# Patient Record
Sex: FEMALE | Race: WHITE
[De-identification: names, ages, dates, MRNs, and addresses within clinical notes are randomized per-mention and may not be internally consistent; named-entity substitution may affect disease eponyms.]

---

## 2019-09-15 ENCOUNTER — HOSPITAL ENCOUNTER (INPATIENT)
Dept: HOSPITAL 92 - ERS | Age: 69
LOS: 4 days | Discharge: HOME | DRG: 175 | End: 2019-09-19
Attending: INTERNAL MEDICINE | Admitting: INTERNAL MEDICINE
Payer: MEDICARE

## 2019-09-15 VITALS — BODY MASS INDEX: 31.6 KG/M2

## 2019-09-15 DIAGNOSIS — Z86.718: ICD-10-CM

## 2019-09-15 DIAGNOSIS — I10: ICD-10-CM

## 2019-09-15 DIAGNOSIS — M19.90: ICD-10-CM

## 2019-09-15 DIAGNOSIS — I26.09: Primary | ICD-10-CM

## 2019-09-15 DIAGNOSIS — Z79.82: ICD-10-CM

## 2019-09-15 DIAGNOSIS — I21.A1: ICD-10-CM

## 2019-09-15 DIAGNOSIS — Z79.899: ICD-10-CM

## 2019-09-15 DIAGNOSIS — E03.9: ICD-10-CM

## 2019-09-15 DIAGNOSIS — G47.30: ICD-10-CM

## 2019-09-15 DIAGNOSIS — Z98.1: ICD-10-CM

## 2019-09-15 DIAGNOSIS — E87.1: ICD-10-CM

## 2019-09-15 DIAGNOSIS — J96.21: ICD-10-CM

## 2019-09-15 PROCEDURE — 71275 CT ANGIOGRAPHY CHEST: CPT

## 2019-09-15 PROCEDURE — 93005 ELECTROCARDIOGRAM TRACING: CPT

## 2019-09-15 PROCEDURE — 85018 HEMOGLOBIN: CPT

## 2019-09-15 PROCEDURE — 93970 EXTREMITY STUDY: CPT

## 2019-09-15 PROCEDURE — 85049 AUTOMATED PLATELET COUNT: CPT

## 2019-09-15 PROCEDURE — 80053 COMPREHEN METABOLIC PANEL: CPT

## 2019-09-15 PROCEDURE — 93306 TTE W/DOPPLER COMPLETE: CPT

## 2019-09-15 PROCEDURE — 83735 ASSAY OF MAGNESIUM: CPT

## 2019-09-15 PROCEDURE — 36415 COLL VENOUS BLD VENIPUNCTURE: CPT

## 2019-09-15 PROCEDURE — 96374 THER/PROPH/DIAG INJ IV PUSH: CPT

## 2019-09-15 PROCEDURE — 82565 ASSAY OF CREATININE: CPT

## 2019-09-15 PROCEDURE — 84443 ASSAY THYROID STIM HORMONE: CPT

## 2019-09-15 PROCEDURE — 85025 COMPLETE CBC W/AUTO DIFF WBC: CPT

## 2019-09-15 PROCEDURE — 83880 ASSAY OF NATRIURETIC PEPTIDE: CPT

## 2019-09-15 PROCEDURE — 85379 FIBRIN DEGRADATION QUANT: CPT

## 2019-09-15 PROCEDURE — 80048 BASIC METABOLIC PNL TOTAL CA: CPT

## 2019-09-15 PROCEDURE — 85014 HEMATOCRIT: CPT

## 2019-09-15 PROCEDURE — 71045 X-RAY EXAM CHEST 1 VIEW: CPT

## 2019-09-15 PROCEDURE — 82553 CREATINE MB FRACTION: CPT

## 2019-09-15 PROCEDURE — 84484 ASSAY OF TROPONIN QUANT: CPT

## 2019-09-16 LAB
ALBUMIN SERPL BCG-MCNC: 4.2 G/DL (ref 3.4–4.8)
ALP SERPL-CCNC: 80 U/L (ref 40–150)
ALT SERPL W P-5'-P-CCNC: 53 U/L (ref 8–55)
ANION GAP SERPL CALC-SCNC: 16 MMOL/L (ref 10–20)
AST SERPL-CCNC: 31 U/L (ref 5–34)
BASOPHILS # BLD AUTO: 0.1 THOU/UL (ref 0–0.2)
BASOPHILS NFR BLD AUTO: 0.7 % (ref 0–1)
BILIRUB SERPL-MCNC: 0.7 MG/DL (ref 0.2–1.2)
BUN SERPL-MCNC: 17 MG/DL (ref 9.8–20.1)
CALCIUM SERPL-MCNC: 10 MG/DL (ref 7.8–10.44)
CHLORIDE SERPL-SCNC: 102 MMOL/L (ref 98–107)
CK MB SERPL-MCNC: 3 NG/ML (ref 0–6.6)
CO2 SERPL-SCNC: 22 MMOL/L (ref 23–31)
CREAT CL PREDICTED SERPL C-G-VRATE: 0 ML/MIN (ref 70–130)
CREAT CL PREDICTED SERPL C-G-VRATE: 81 ML/MIN (ref 70–130)
EOSINOPHIL # BLD AUTO: 0.1 THOU/UL (ref 0–0.7)
EOSINOPHIL NFR BLD AUTO: 0.7 % (ref 0–10)
GLOBULIN SER CALC-MCNC: 3 G/DL (ref 2.4–3.5)
GLUCOSE SERPL-MCNC: 130 MG/DL (ref 80–115)
HGB BLD-MCNC: 14.3 G/DL (ref 12–16)
HGB BLD-MCNC: 15.6 G/DL (ref 12–16)
LYMPHOCYTES # BLD: 2.6 THOU/UL (ref 1.2–3.4)
LYMPHOCYTES NFR BLD AUTO: 17.4 % (ref 21–51)
MAGNESIUM SERPL-MCNC: 2.3 MG/DL (ref 1.6–2.6)
MCH RBC QN AUTO: 32.2 PG (ref 27–31)
MCV RBC AUTO: 95.9 FL (ref 78–98)
MONOCYTES # BLD AUTO: 1.2 THOU/UL (ref 0.11–0.59)
MONOCYTES NFR BLD AUTO: 8 % (ref 0–10)
NEUTROPHILS # BLD AUTO: 10.9 THOU/UL (ref 1.4–6.5)
NEUTROPHILS NFR BLD AUTO: 73.2 % (ref 42–75)
PLATELET # BLD AUTO: 292 THOU/UL (ref 130–400)
PLATELET # BLD AUTO: 392 THOU/UL (ref 130–400)
POTASSIUM SERPL-SCNC: 4.6 MMOL/L (ref 3.5–5.1)
RBC # BLD AUTO: 4.85 MILL/UL (ref 4.2–5.4)
SODIUM SERPL-SCNC: 135 MMOL/L (ref 136–145)
TROPONIN I SERPL DL<=0.01 NG/ML-MCNC: 0.04 NG/ML (ref ?–0.03)
TROPONIN I SERPL DL<=0.01 NG/ML-MCNC: 0.08 NG/ML (ref ?–0.03)
WBC # BLD AUTO: 14.9 THOU/UL (ref 4.8–10.8)

## 2019-09-16 NOTE — RAD
PORTABLE UPRIGHT FRONTAL CHEST RADIOGRAPH:

 

DATE: 9/16/2019.

 

COMPARISON: 

None.

 

HISTORY: 

Pain.

 

FINDINGS: 

Shadows are prominent suggesting prominence of the pulmonary arterial vasculature.  No pneumothorax o
r pleural fluid.  No focal consolidation or alveolar edema.

 

IMPRESSION: 

Findings suggesting nonspecific enlargement of the pulmonary arterial vasculature.  This could be bet
ter assessed via CT.  No focal consolidation or alveolar edema.

 

POS: OFF

## 2019-09-16 NOTE — ULT
DOPPLER VENOUS ULTRASOUND OF BOTH LOWER EXTREMITIES:

 

Date:  09/1/19 

 

INDICATION:

History of PE. 

 

TECHNIQUE:

Gray scale, color Doppler, and vascular duplex with spectral analysis was performed of the deep venou
s structures of the bilateral lower extremities.  The common femoral vein, superficial femoral vein, 
proximal greater saphenous vein, proximal greater profunda vein, popliteal, and posterior tibial vein
s were assessed.

 

FINDINGS:

Normal compression, flow, and augmentation was seen within the deep venous structures of both lower e
xtremities. 

 

IMPRESSION:

No evidence of deep venous thrombosis within both lower extremities. 

 

 

 

POS: LEANDER

## 2019-09-16 NOTE — HP
CHIEF COMPLAINT:  Shortness of breath.



HISTORY OF PRESENT ILLNESS:  Ms. Brar is a 69-year-old female with past medical

history of hypertension, hypothyroidism, and deep venous thrombosis, presents to the

emergency room with shortness of breath for the last two weeks, got worse over the

last few days.  Patient is not able to walk across the room without having to sit

down, condition got worse, patient presented to the emergency room today.  Patient

reports shoulder injury two weeks ago.  Believes it is a sprain.  Also, noticed that

her bilateral leg swelling in the last few days.  Workup in the emergency room

including CT angiogram, the patient was found to have a large pulmonary embolism

with right ventricular strain.  The ED physician discussed the case with

pulmonologist, who advised to give the patient tPA, and start Lovenox 4 hours later.

 Patient's BNP is more than 2000, troponin is elevated.  Patient is being admitted

to the intensive care unit for further management. 



PAST MEDICAL HISTORY:  

1. Hypertension.

2. Hypothyroidism.

3. Deep venous thrombosis.



PAST SURGICAL HISTORY:  

1. Spinal fusion.

2. Right ankle surgery.



SOCIAL HISTORY:  Reviewed.  It is elsewhere in the chart.



HOME MEDICATIONS:  Please see home medication reconciliation form for updated

medications. 



FAMILY HISTORY:  Reviewed and noncontributory.



REVIEW OF SYSTEMS:  Review of 14 systems negative, except what is mentioned in

present illness. 



PHYSICAL EXAMINATION:

GENERAL:  Patient is awake, alert, in moderate respiratory distress. 

VITAL SIGNS:  Blood pressure 129/79, pulse is 70, respiratory rate is 18, pulse

oximetry 97% on room air, and temperature is 98.2. 

HEAD AND NECK:  Normocephalic, atraumatic.  Neck is supple.  No JVD. 

CHEST:  Fair bilateral air entry, no added sounds. 

HEART:  S1, S2.  Regular. 

ABDOMEN:  Soft, nontender.  Bowel sounds present. 

NEUROLOGIC:  Awake, alert, and oriented x3. 

PSYCH:  Normal mood. 

EXTREMITIES:  Bilateral leg swelling.



LABORATORY DATA:  Troponin 0.04.  BNP 2709.  D-dimer elevated at 5.2.



IMAGING:  CT angiogram of the chest as mentioned above in the history of present

illness. 



ASSESSMENT AND PLAN:  

1. Acute pulmonary embolism with right ventricular strain, elevated D-dimer and

elevated troponin and elevated BNP. 

2. Hypertension.

3. Hypothyroidism.

4. History of deep venous thrombosis.



PLAN:  

1. Admit to the intensive care unit.

2. Tpa was given in the ED.

3. To start anticoagulation 4 hours later as per Pulmonary.

4. Pulmonary/intensivist's consult for evaluation and further management.

5. Get venous Doppler to rule out DVT.

6. 2D echo.

7. Reconcile home medications.

8. Expected length of stay is three midnights or more.







Job ID:  [id number]

## 2019-09-16 NOTE — CT
PRELIMINARY REPORT/[facility]/EMERGENCY AFTER HOURS PROCEDURE:



Addendum created by Odin Campbell MD on 9/16/2019 1:35 AM Central Time ([place] & [place])

These results were given to nurse Dillan Carballo on 9/16/2019 1:34 AM CDT who stated he would notify the 
patient's physician. Initial Report created on 9/16/2019 1:23 AM Central Time ([place] & [place])



EXAM:

CT Angiography Chest With Contrast



EXAM DATE/TIME:

9/16/2019 12:59 AM



CLINICAL HISTORY:

69 years old, female; Shortness of breath; Patient HX: F69 presents to ED for SOB. PT reports she has
 been SOB for a couple of weeks, has only been getting worse w/n past few days. Cannot walk across

room without having to sit down, normally healthy and very active. Seen 2 doctors about SX. PT diagno
sed with bronchitis and began taking abx on Thursday. Had associated cough w some clear sputum. PT

reports injuring shoulder 2 weeks ago, no FX in xrays, believes its a sprain, has been less active an
d then SOB began. Also been taking muscle relaxers. PT denies any chest pain. Has had low grade

fever x1 and some night sweats



TECHNIQUE:

Imaging protocol: Computed tomographic angiography of the chest with intravenous contrast.

3D rendering: MIP reconstructed images were created and reviewed.



COMPARISON:

No relevant prior studies available.



FINDINGS:

Pulmonary arteries: Extensive, acute-appearing pulmonary emboli within the distal main pulmonary kerline
brittany extending into the lobar and segmental branches of the upper and lower lobes bilaterally.

Aorta: Unremarkable. No aortic aneurysm. No aortic dissection.

Lungs: Small groundglass opacities within the anterior right upper lobe (series 2, image 38) in

posterior right upper lobe (series 2, image 57, possibly pneumonitis or pulmonary

infarction/hemorrhage.

Pleural space: Unremarkable. No pneumothorax. No pleural effusion.

Heart: Marked enlargement of the right atrial and right ventricular chambers, with RV/LV ratio of

approximately 1.4, compatible with acute right heart strain.

Lymph nodes: Unremarkable. No enlarged lymph nodes.

Bones/joints: Unremarkable. No acute fracture.

Soft tissues: Unremarkable.



IMPRESSION:

1. Extensive bilateral upper and lower lobe pulmonary emboli, with associated severe acute right

heart strain.

2. Small groundglass opacities within the anterior right upper lobe (series 2, image 38) in posterior


right upper lobe (series 2, image 57, possibly pneumonitis or pulmonary infarction/hemorrhage.



Thank you for allowing us to participate in the care of your patient.

Dictated and Authenticated by: Odin Campbell MD

09/16/2019 1:23 AM Central Time ([place] & [place])







FINAL REPORT:



CT ARTERIOGRAM CHEST WITH IV CONTRAST AND 3D IMAGING:



DATE: 9/16/2019.

TIME: Performed on an emergency basis at 0100 hours.



HISTORY: 

Chest pain. Dyspnea.



FINDINGS: 

Agree with the preliminary report by Dr. Campbell from [facility]. Extensive bilateral pulmonary
 emboli. Evidence of right heart strain.



Transcribed Date/Time: 9/16/2019 8:06 AM



Reported By: MARTHA Hernandez 

Electronically Signed:  9/16/2019 9:08 AM

## 2019-09-16 NOTE — PDOC.EVN
Event Note





- Event Note


Event Note: 





Seen and examined. Breathing more easily on low flow nasal canula. Denies chest 

pain or discomfort this AM. Overall states she is feeling much better. States 

she had a DVT LE after a 6 hour surgery, though that was more than 20 years 

ago. Family at bedside, all questions answered in detail.

## 2019-09-16 NOTE — CON
DATE OF CONSULTATION:  09/16/2019



HISTORY OF PRESENT ILLNESS:  Angie Brar is a 69-year-old female from

[place], [place], 95 kg, presented to the ER with shortness of breath.  In ER,

saturations are 96% on room air, blood pressure 125/83, respiratory rate 20, and

temperature 98. 



CT angio showed bilateral pulmonary emboli.  I am told she received 50 mg of tPA.

She is in the ICU. 



History as per the daughter who works at [employer].   She developed some

shoulder pain, went to the Urgent Care, was given some medication.  After she

developed bronchitis, went to see a primary care doctor, was given some antibiotics.

 Continued to have some shoulder pain and shortness of breath.  Daughter brought

over here because she is not getting any better. 



Nonsmoker.  No prior history of TB, pneumonia, or bronchial asthma.



PAST MEDICAL HISTORY:  Pertinent mainly for arthritis, hypertension, and

hypothyroidism. 



PREVIOUS SURGERIES:  Ankle surgery and spinal fusion surgery.



HOME MEDICATIONS:  

1. Magnesium.

2. Benadryl.

3. Melatonin.

4. Sectral 200.

5. B complex.

6. Garlic.

7. Vitamin E.

8. Aspirin 81.

9. Pravachol 20.

10. Synthroid 25.

11. Hydrochlorothiazide 12.5.

12. Irbesartan 150.



ALLERGIES:  NONE.



SOCIAL HISTORY:  Unremarkable.



FAMILY HISTORY:  Unremarkable.



PHYSICAL EXAMINATION:

GENERAL:  In the ICU.  She is in no distress. 

VITAL SIGNS:  Blood pressure 156/80, pulse 80, respiratory rate 16. 

CHEST: No wheezing or crackles. 

CARDIAC:  Normal S1, S2.  No gallops. 

ABDOMEN:  No masses.



LABORATORY DATA:  H and H are stable.  Platelet count is normal.  Her BNP is

elevated at 2709, surprising for slightly elevated. Glucose 130. 



IMPRESSION:  

1. Status post tPA, apparently 50 mg were given.

2. Bilateral pulmonary emboli.  No evidence of deep venous thrombosis.

3. Hypertension.

4. Hypothyroidism.

5. __________ previous deep venous thrombosis in the leg following this spinal

fusion 20 years ago. 



PLAN:  She was started on Lovenox  __________ eventually when she is stable, switch

over to Eliquis.  We will follow her in the ICU. 



Consultation note, 70 minutes, 50% direct patient care.







Job ID:  [id number]

## 2019-09-17 RX ADMIN — Medication SCH TAB: at 10:31

## 2019-09-17 RX ADMIN — ASPIRIN SCH MG: 81 TABLET ORAL at 10:30

## 2019-09-17 NOTE — PDOC.HOSPP
- Subjective


Encounter Date: 09/17/19


Encounter Time: 17:10


Subjective: 





Dry cough, feels legs are a bit swollen; at home uses HCTZ 12.5mg daily with 

potassium M/W/F.  No nausea, appetite returning, up walking around room. 

Daughter at bedside.





- Objective


Vital Signs & Weight: 


 Vital Signs (12 hours)











  Temp Pulse Pulse BP BP Pulse Ox Pulse Ox


 


 09/17/19 16:00  98.5 F      


 


 09/17/19 12:00  98.4 F      


 


 09/17/19 10:00   67  71  127/59 L  122/68   94 L


 


 09/17/19 08:00       92 L 


 


 09/17/19 07:00  98.1 F      














  Pulse Ox


 


 09/17/19 16:00 


 


 09/17/19 12:00 


 


 09/17/19 10:00  93 L


 


 09/17/19 08:00 


 


 09/17/19 07:00 








 Weight











Admit Weight                   201 lb


 


Weight                         201 lb 15.095 oz











 Most Recent Monitor Data











Heart Rate from ECG            66


 


NIBP                           134/65


 


NIBP BP-Mean                   88


 


Respiration from ECG           13


 


SpO2                           92














I&O: 


 











 09/16/19 09/17/19 09/18/19





 06:59 06:59 06:59


 


Intake Total 51.9 1923.8 720


 


Output Total 650 1700 275


 


Balance -598.1 223.8 445











Result Diagrams: 


 09/16/19 02:58





 09/16/19 02:58





Hospitalist ROS





- Medication


Medications: 


Active Medications











Generic Name Dose Route Start Last Admin





  Trade Name Freq  PRN Reason Stop Dose Admin


 


Acebutolol HCl  200 mg  09/16/19 21:00  09/16/19 22:11





  Sectral  PO   Not Given





  QPM SHERMAN   





     





     





     





     


 


Aspirin  81 mg  09/17/19 09:00  09/17/19 10:30





  Ecotrin  PO   81 mg





  DAILY SHERMAN   Administration





     





     





     





     


 


Diphenhydramine HCl  50 mg  09/16/19 21:00  09/16/19 21:57





  Benadryl  PO   50 mg





  HS SHERMAN   Administration





     





     





     





     


 


Levothyroxine Sodium  25 mcg  09/17/19 06:00  09/17/19 07:05





  Synthroid  PO   25 mcg





  0600 SHERMAN   Administration





     





     





     





     


 


Losartan Potassium  50 mg  09/17/19 09:00  09/17/19 10:33





  Cozaar  PO   50 mg





  DAILY SHERMAN   Administration





     





     





     





     


 


Magnesium Oxide  800 mg  09/17/19 09:00  09/17/19 10:31





  Magnesium Oxide  PO   800 mg





  DAILY SHERMAN   Administration





     





     





     





     


 


Melatonin  9 mg  09/16/19 21:00  09/16/19 21:58





  Melatonin  PO   9 mg





  HS SHERMAN   Administration





     





     





     





     


 


Multivitamins/Zinc  1 tab  09/17/19 09:00  09/17/19 10:31





  Stress 600 With Zinc  PO   1 tab





  DAILY SHERMAN   Administration





     





     





     





     


 


Pravastatin Sodium  20 mg  09/16/19 21:00  09/16/19 21:59





  Pravachol  PO   20 mg





  QPM SHERMAN   Administration





     





     





     





     














- Exam


General Appearance: NAD


Eye: PERRL


ENT: moist mucosa


Neck: supple


Heart: RRR


Respiratory: no wheezes


Respiratory - other findings: right mid zone crackles


Gastrointestinal: soft, non-tender, non-distended


Extremities - other findings: trace pedal edema


Skin: no rashes


Neurological: no new deficit


Musculoskeletal: no muscle wasting


Psychiatric: A&O x 3





Hosp A/P


(1) Pulmonary embolism


Code(s): I26.99 - OTHER PULMONARY EMBOLISM WITHOUT ACUTE COR PULMONALE   Status

: Acute   





(2) Hypertension


Code(s): I10 - ESSENTIAL (PRIMARY) HYPERTENSION   Status: Acute   





(3) Hypothyroid


Code(s): E03.9 - HYPOTHYROIDISM, UNSPECIFIED   Status: Acute   





- Plan





Pulm - planned transition to [facility], appreciate Dr. Hawthorne's care; LE 

dopplers negative for DVT


Cards - restart home HCTZ, oral K every m/w/f


Endo - continue home thyroid medication


Transfer to tele

## 2019-09-17 NOTE — PRG
DATE OF SERVICE:  09/17/2019



SERVICE:  Pulmonary Medicine.



INTERVAL HISTORY:  The patient is doing really well from respiratory standpoint.

She has been weaned down to room air.  She has noted complaints of shortness of

breath or dyspnea with exertion.  She denies any fevers or chills.  She had an

uneventful evening.  Ever since she got the TPN, her breathing, and shoulder

discomfort had eased up. 



PHYSICAL EXAMINATION:

VITAL SIGNS:  Afebrile, pulse 66, blood pressure 134/65, respirations 13, saturation

92%, currently on room air. 

GENERAL:  The patient is awake and alert, in no apparent distress. 

LUNGS:  Decent air entry.  There is no prolonged expiratory phase or wheezing

appreciated. 

HEART:  Normal rate and regular. 

ABDOMEN:  Soft, nontender, nondistended.  Bowel sounds are positive. 

MUSCULOSKELETAL:  No cyanosis or clubbing.  There is no pitting in the bilateral

lower extremities. 

NEUROLOGIC:  Grossly nonfocal.



LABORATORY DATA:  Hemoglobin 14.3 and stable, platelets 292,000.  D-dimer 5.20.

Troponin is gently uptrending as of this morning.  BNP 2700, TSH 2.5.  Basic

metabolic profile and liver function studies are otherwise unremarkable. 



IMAGING STUDIES:  Echocardiogram shows a normal ejection fraction.  The right atrium

is enlarged.  The right ventricle is moderately enlarged.  RVSP is mildly elevated.

IVC is dilated. 



CTA of the chest demonstrates large volume pulmonary emboli are present throughout

bilateral lung fields.  RV and RA are dilated.  There is significant reflux of

contrast into the inferior vena cava.  The flattening of the septum suggestive of

right volume overload. 



Ultrasound of the bilateral lower extremities demonstrates no evidence of DVT.



ASSESSMENT:  

1. Acute hypoxic respiratory failure, resolved.

2. Acute pulmonary embolism, submassive, status post tPA.

3. Right ventricular heart strain.

4. Non-ST-elevation myocardial infarction secondary to the demand of the pulmonary

embolism. 

5. Hyponatremia, mild.



DISCUSSION AND PLAN:  I am going to be repeating the troponin and the BNP tomorrow

morning.  If they are not downtrending, we will need to consider transitioning the

patient to [place] for more advanced procedure.  We will also check the sodium.  IV

fluids are going to be interrupted.  I am going to cautiously give her a small dose

of Lasix.  Pulmonary/Critical Care will continue to follow along while the patient

remains in-house, but she is stable for transition to the telemetry unit.  We are

going to initiate some mobilization efforts. 







Job ID:  [id number]

## 2019-09-18 LAB
ANION GAP SERPL CALC-SCNC: 11 MMOL/L (ref 10–20)
BUN SERPL-MCNC: 8 MG/DL (ref 9.8–20.1)
CALCIUM SERPL-MCNC: 8.8 MG/DL (ref 7.8–10.44)
CHLORIDE SERPL-SCNC: 102 MMOL/L (ref 98–107)
CO2 SERPL-SCNC: 27 MMOL/L (ref 23–31)
CREAT CL PREDICTED SERPL C-G-VRATE: 92 ML/MIN (ref 70–130)
GLUCOSE SERPL-MCNC: 95 MG/DL (ref 80–115)
HGB BLD-MCNC: 12.3 G/DL (ref 12–16)
POTASSIUM SERPL-SCNC: 3.6 MMOL/L (ref 3.5–5.1)
SODIUM SERPL-SCNC: 136 MMOL/L (ref 136–145)
TROPONIN I SERPL DL<=0.01 NG/ML-MCNC: 0.03 NG/ML (ref ?–0.03)

## 2019-09-18 RX ADMIN — ASPIRIN SCH MG: 81 TABLET ORAL at 08:02

## 2019-09-18 RX ADMIN — Medication SCH TAB: at 08:01

## 2019-09-18 NOTE — PRG
DATE OF SERVICE:  09/18/2019



SERVICE:  Pulmonary Medicine.



INTERVAL HISTORY:  The patient is doing fine from respiratory standpoint.  
Breathing

comfortably.  She is able to walk and ambulate without need for any oxygen.  She

does not have any significant desaturation there.  Her shortness of breath when 
she

is exerting herself is almost to baseline.  There were no overnight events.  
She had

continuous desaturation down to as low as 86%.  The alarms are going off 
frequently.

 As such, she was placed on a little bit of oxygen, which seemed to resolve the

nighttime hypoxemia.  It is not clear as to whether or not this is a chronic 
thing

or an acute thing.  I favor this being a chronic related condition, given that 
her

daytime symptoms have resolved.  It is not clear whether or not this was a saw-
tooth

desaturation associated with sleep apnea, whether or not she just simply 
dropped her

saturations because of not taking deep breaths.  Either way, this can be sorted 
out

in the outpatient setting. 



PHYSICAL EXAMINATION:

VITAL SIGNS:  Afebrile, pulse 70, blood pressure 107/61, respirations 14, and

saturation 95% on room air. 

GENERAL:  The patient is awake and alert, in no apparent distress. 

LUNGS:  Excellent air entry with no prolonged expiratory phase or wheezing 
present. 

HEART:  Normal rate, regular. 

ABDOMEN:  Soft, nontender, nondistended.  Bowel sounds are positive. 

MUSCULOSKELETAL:  No cyanosis or clubbing.  There is 1+ pitting in the bilateral

lower extremities. 

NEUROLOGIC:  Grossly nonfocal.



LABORATORY DATA:  Hemoglobin 12.3.  Basic metabolic profile is completely

unremarkable.  Creatinine is downtrending.  Her troponin has cleared, and her 
BNP

has downtrended from 2700 to 370. 



ASSESSMENT:  

1. Acute hypoxic respiratory failure, resolved during the daytime and with 
exertion.

2. Chronic hypoxic respiratory failure, suspected at night.

3. Acute pulmonary embolism, submassive, status post tPA.

4. Right ventricular heart strain, resolving.

5. Non-ST-elevation myocardial infarction secondary to demand from the pulmonary

embolism, resolved. 

6. Hyponatremia, resolved.



DISCUSSION AND PLAN:  The patient is doing fantastic from a respiratory 
standpoint.

At this point, I think she is stable for transition out of the hospital.  I 
would

like for her to go home with what I presumed to be a chronic hypoxic respiratory

failure at night.  In about a month, I will repeat an overnight oximetry study.
  If

she has saw-tooth desaturation, she will be referred for a polysomnogram.  If 
on the

other hand, she has continuous desaturation, oxygen will be continued, 
indefinitely

at night.  She indicates that she had much improved quality of rest with the 
oxygen.

 As such, I do think that this could be chronic in nature. 







Job ID:  [id number]



MTDD

## 2019-09-18 NOTE — PDOC.HOSPP
- Subjective


Encounter Date: 09/18/19


Encounter Time: 10:30


Subjective: 





pt up in bed no complains





- Objective


Vital Signs & Weight: 


 Vital Signs (12 hours)











  Temp Pulse Resp BP Pulse Ox


 


 09/18/19 16:30  97.6 F  67  18  129/74  96


 


 09/18/19 16:00  98.4 F    


 


 09/18/19 12:00  98.4 F    


 


 09/18/19 07:35      94 L


 


 09/18/19 07:00  98.4 F    








 Weight











Admit Weight                   201 lb


 


Weight                         201 lb 15.095 oz











 Most Recent Monitor Data











Heart Rate from ECG            63


 


NIBP                           111/65


 


NIBP BP-Mean                   80


 


Respiration from ECG           21


 


SpO2                           96














I&O: 


 











 09/17/19 09/18/19 09/19/19





 06:59 06:59 06:59


 


Intake Total 1923.8 1212 960


 


Output Total 1700 4175 1950


 


Balance 223.8 -2963 -990











Result Diagrams: 


 09/18/19 05:00





 09/18/19 05:00





Hospitalist ROS





- Review of Systems


Respiratory: denies: cough, dry, shortness of breath, hemoptysis, SOB with 

excertion, pleuritic pain, sputum, wheezing, other


Cardiovascular: denies: chest pain, palpitations, orthopnea, paroxysmal noc. 

dyspnea, edema, light headedness, other


Gastrointestinal: denies: nausea, vomiting, abdominal pain, diarrhea, 

constipation, melena, hematochezia, other





- Medication


Medications: 


Active Medications











Generic Name Dose Route Start Last Admin





  Trade Name Freq  PRN Reason Stop Dose Admin


 


Acebutolol HCl  200 mg  09/16/19 21:00  09/17/19 21:46





  Sectral  PO   200 mg





  QPM SHERMAN   Administration





     





     





     





     


 


Apixaban  10 mg  09/17/19 21:00  09/18/19 08:01





  Eliquis  PO  09/24/19 21:01  10 mg





  BID SHERMAN   Administration





     





     





     





     


 


Aspirin  81 mg  09/17/19 09:00  09/18/19 08:02





  Ecotrin  PO   81 mg





  DAILY SHERMAN   Administration





     





     





     





     


 


Benzonatate  100 mg  09/16/19 18:24  09/18/19 10:23





  Tessalon  PO   100 mg





  Q4H PRN   Administration





  Cough   





     





     





     


 


Diphenhydramine HCl  50 mg  09/16/19 21:00  09/17/19 23:04





  Benadryl  PO   50 mg





  HS SHERMAN   Administration





     





     





     





     


 


Hydrochlorothiazide  12.5 mg  09/18/19 09:00  09/18/19 08:02





  Hydrochlorothiazide  PO   12.5 mg





  DAILY SHERMAN   Administration





     





     





     





     


 


Levothyroxine Sodium  25 mcg  09/17/19 06:00  09/18/19 06:09





  Synthroid  PO   25 mcg





  0600 SHERMAN   Administration





     





     





     





     


 


Losartan Potassium  50 mg  09/17/19 09:00  09/18/19 08:02





  Cozaar  PO   50 mg





  DAILY SHERMAN   Administration





     





     





     





     


 


Magnesium Oxide  800 mg  09/17/19 09:00  09/18/19 08:02





  Magnesium Oxide  PO   800 mg





  DAILY SHERMAN   Administration





     





     





     





     


 


Melatonin  9 mg  09/16/19 21:00  09/17/19 23:05





  Melatonin  PO   9 mg





  HS SHERMAN   Administration





     





     





     





     


 


Multivitamins/Zinc  1 tab  09/17/19 09:00  09/18/19 08:01





  Stress 600 With Zinc  PO   1 tab





  DAILY SHERMAN   Administration





     





     





     





     


 


Potassium Chloride  10 meq  09/18/19 09:00  09/18/19 08:02





  Klor-Con 10  PO   10 meq





  MWF SHERMAN   Administration





     





     





     





     


 


Pravastatin Sodium  20 mg  09/16/19 21:00  09/17/19 21:47





  Pravachol  PO   20 mg





  QPM SHERMAN   Administration





     





     





     





     














- Exam


Neck: negative: supple, symmetric, no JVD, no thyromegaly, no lymphadenopathy, 

no carotid bruit, JVD


Heart: negative: RRR, no murmur, no gallops, no rubs, normal peripheral pulses, 

irregular, diminshed peripheral pulses, murmur present, II/IV, III/IV


Respiratory: negative: CTAB, no wheezes, no rales, no ronchi, normal chest 

expansion, no tachypnea, normal percussion, rales, rhonchi, tachypneic, wheezes


Gastrointestinal: negative: soft, non-tender, non-distended, normal bowel sounds

, no palpable masses, no hepatomegaly, no splenomegaly, no bruit, no guarding, 

no rigidity, tender to palpation, distended, diminished bowl sounds, voluntary 

guarding





Hosp A/P


(1) Pulmonary embolism


Code(s): I26.99 - OTHER PULMONARY EMBOLISM WITHOUT ACUTE COR PULMONALE   Status

: Acute   





(2) Hypertension


Code(s): I10 - ESSENTIAL (PRIMARY) HYPERTENSION   Status: Acute   





(3) Hypothyroid


Code(s): E03.9 - HYPOTHYROIDISM, UNSPECIFIED   Status: Acute   





- Plan





will continue eliquis. pt feels well. Not sure if pt needs oxygen.

## 2019-09-19 VITALS — SYSTOLIC BLOOD PRESSURE: 155 MMHG | DIASTOLIC BLOOD PRESSURE: 70 MMHG

## 2019-09-19 VITALS — TEMPERATURE: 98 F

## 2019-09-19 RX ADMIN — Medication SCH TAB: at 10:04

## 2019-09-19 RX ADMIN — ASPIRIN SCH MG: 81 TABLET ORAL at 10:02

## 2019-09-19 NOTE — PQF
CLINICAL DOCUMENTATION IMPROVEMENT CLARIFICATION FORM:  ICD-10 Updated

PLEASE DO AN ADDENDUM TO THE PROGRESS NOTE WITH ANY DOCUMENTATION UPDATES OR 
ADDITIONS AND CARRY THROUGH TO DC SUMMARY.   THANK YOU.



DATE:   9/19/2019                                           ATTN:  Dr. Gonzales



Please exercise your independent, professional judgment in responding to the 
clarification form. 

Clinical indicators are provided on the bottom of this form for your review



Please check appropriate box(s):

Conflicting documentation was noted in the Medical Record, please clarify if 
patient is being treated/monitored for:

[ x ] Pulmonary Embolism with associated Acute Cor Pulmonale   

[  ] Pulmonary Embolism  without associated Acute Cor Pulmonale

[  ] Other diagnosis ___________

[  ] Unable to determine



In addition, please specify:

Present on Admission (POA):  [  x] Yes             [  ] No             [  ] 
Unable to determine



For continuity of documentation, please document condition throughout progress 
notes and discharge summary.  Thank You.



CLINICAL INDICATORS - SIGNS / SYMPTOMS/ LABS 



H&P 9/16: Acute pulmonary embolism with right ventricular strain, elevated  D-
dimer and 

                elevated troponin and elevated BNP.



9/17 ([place])  Acute pulmonary embolism, submassive, s/p tPA.

                       Right ventricular heart strain.



PN 9/17: Pulmonary embolis. Other Pulmonary Embolism without acute cor 
pulmonale. Acute.



RISKS:

H&P 9/16: HTN. Hx of Deep Venous Thrombosis. 



TREATMENT: 

ER Record 9/16: tPA IV 

Order 9/16: Resp: O2 to keep sats 92%

Order 9/16: ECHO

MAR 9/17: Eliquis 10 mg po BID

______________________________________________________



Thank you,

Angie



(This form is maintained as a part of the permanent medical record)

 2015 [employer].  All Rights Reserved

Angie Villafuerte RN, BSN    [email or web address]    Office: [phone]

                                                              

[facility]

## 2019-09-20 NOTE — DIS
DATE OF ADMISSION:  09/16/2019



DATE OF DISCHARGE:  09/19/2019



DISCHARGE DIAGNOSES:  

1. Bilateral massive pulmonary embolism, status post tPA.

2. Hypertension.

3. Hypothyroidism.

4. RV straining.



HOSPITAL COURSE:  The patient is a 69-year-old female who initially presented to the

hospital with shoulder pain, which progressively over the weeks got worse to the

point that she came into the hospital with some shortness of breath and chest

tightness.  She underwent a CTA which indicated extensive bilateral upper and lower

pulmonary emboli associated with severe acute right heart straining.  She was given

a half dose of tPA and she was then admitted into the ICU with closer monitoring.

Initially her BNP was significantly elevated; however, upon rechecking it, BNP

improved.  She did have a LYDIA, which indicated an EF of 50% to 55%.  She did have a

mild enlarged right atrium and also a dilated IVC and a right ventricular systolic

pressure which was mildly elevated.  She was seen by Pulmonary, who initially

started her, after the tPA, on Lovenox and thus was transitioned to Eliquis.  She

will take 7 days of Eliquis 10 mg twice a day and then transition over to 5 mg twice

a day.  The patient was found to have nocturnal hypoxia and per Pulmonology's note,

she was supposed to follow up.  She was supposed to get night O2, however, per

insurance qualification, she could not.  The patient will pay out of pocket for

this.  She will follow up with Pulmonary in about 2 weeks for re-evaluation. 



I have told the patient and the family that she will need screening for mammogram

and colonoscopy in the future.  Also, she will follow up with Pulmonary to repeat

echocardiogram. 



HOME MEDICATIONS:  

1. Eliquis 5 mg, initially 10 mg twice a day for 7 days, which is an additional 5

days and then 5 mg twice a day. 

2. Hydrochlorothiazide 12.5 daily.

3. Irbesartan 150 mg daily.

4. Pravastatin 20 mg daily.

5. Levothyroxine 25 mcg daily.

6. Aspirin 81 mg daily.

7. Melatonin 10 mg at bedtime.



PHYSICAL EXAMINATION:

VITAL SIGNS:  Temperature of 98.0, 68, 18, 95% on room air, 140/68. 

GENERAL:  She is awake, alert, and oriented x3.  Does not appear in distress. 

CV:  S1, S2 present.  No murmurs, rubs, or gallops. 



The patient has been ambulated multiple times around and she has had no signs of

shortness of breath, however, except when she sleeps at night.  She has probably an

underlying sleep apnea. 







Job ID:  [id number]

## 2019-09-21 NOTE — EKG
Test Reason : 

Blood Pressure : ***/*** mmHG

Vent. Rate : 073 BPM     Atrial Rate : 073 BPM

   P-R Int : 216 ms          QRS Dur : 088 ms

    QT Int : 466 ms       P-R-T Axes : 059 172 057 degrees

   QTc Int : 513 ms

 

Sinus rhythm with 1st degree A-V block

Low voltage QRS

Inferior infarct , age undetermined

Possible Anterolateral infarct , age undetermined

Prolonged QT

Abnormal ECG

 

Confirmed by JAYJAY VAZQUEZ ([id number]),  SHAN SOLANO ([id number]) on 9/21/2019 1:23:12 PM

 

Referred By:             Confirmed By:JAYJAY VAZQUEZ

## 2019-09-21 NOTE — EKG
Test Reason : SOB

Blood Pressure : ***/*** mmHG

Vent. Rate : 083 BPM     Atrial Rate : 083 BPM

   P-R Int : 198 ms          QRS Dur : 078 ms

    QT Int : 402 ms       P-R-T Axes : 052 166 078 degrees

   QTc Int : 472 ms

 

Normal sinus rhythm

Inferior infarct , age undetermined

Anterolateral infarct , age undetermined

Abnormal ECG

T wave inversion V3-V6

 

Confirmed by JAYJAY VAZQUEZ ([id number]),  SHAN SOLANO ([id number]) on 9/21/2019 1:21:53 PM

 

Referred By:             Confirmed By:JAYJAY VAZQUEZ

## 2019-09-21 NOTE — EKG
Test Reason : 

Blood Pressure : ***/*** mmHG

Vent. Rate : 076 BPM     Atrial Rate : 076 BPM

   P-R Int : 208 ms          QRS Dur : 080 ms

    QT Int : 468 ms       P-R-T Axes : 056 167 074 degrees

   QTc Int : 526 ms

 

Normal sinus rhythm

Inferior infarct , age undetermined

Anterolateral infarct , age undetermined

Prolonged QT

Abnormal ECG

#2

Evolving T wave inversions V2,V3

 

Confirmed by JAYJAY VAZQUEZ ([id number]),  SHAN SOLANO ([id number]) on 9/21/2019 1:22:51 PM

 

Referred By:             Confirmed By:JAYJAY VAZQUEZ

## 2023-08-30 NOTE — PRG
DATE OF SERVICE:  09/19/2019



SERVICE:  Pulmonary Medicine.



INTERVAL HISTORY:  The patient actually really like was sleeping with oxygen last

night.  She indicates that she got much more consolidated sleep and she typically

even gets at home.  This is quite interesting given that she was actually in a

hospital bed.  Otherwise, there were no events overnight.  She has been walking with

physical therapy without any significant dyspnea.  Her shortness of breath during

the daytime is back to baseline.  My suspicion is that we are dealing with a chronic

issue.  She relates to me that she had a sleep study done about 2 years ago.  She

had mild sleep apnea at the time.  She was fit with a mandibular advancement device,

which she does not like wearing. 



PHYSICAL EXAMINATION:

VITAL SIGNS:  Afebrile, pulse 68, blood pressure 140/68, respirations 18, and

saturation 95% on room air. 

GENERAL:  The patient is awake and alert, in no apparent distress. 

LUNGS:  Wonderful air entry.  There is no prolonged expiratory phase, wheezing,

crackles, or rhonchi present. 

HEART:  Normal rate and regular. 

ABDOMEN:  Soft, nontender, and nondistended.  Bowel sounds are positive. 

MUSCULOSKELETAL:  No cyanosis or clubbing.  No pitting in the bilateral lower

extremities. 

NEUROLOGIC:  Grossly nonfocal.



ASSESSMENT:  

1. Acute hypoxic respiratory failure, resolved.

2. Chronic hypoxic respiratory failure at night, likely associated with mild sleep

apnea. 

3. Acute pulmonary embolism, submassive, status post tPA.

4. Right ventricular heart strain, resolved.

5. Non-ST-elevation myocardial infarction secondary to demand from the pulmonary

embolism, resolved. 



DISCUSSION AND PLAN:  The patient remains stable for transition out of the hospital.

 We will set up oxygen in the outpatient setting.  I will do an oximetry in about a

month.  I will have her followup with a local doctor in [place], [place], as [place] [facility] and it would be a significant hardship for her to travel

to see me.  I have asked her to set that appointment with a pulmonologist local to

her as soon as she gets out of the hospital. 







Job ID:  [id number] Call bell/Explanation of exam/test/Position of comfort